# Patient Record
Sex: FEMALE | Race: OTHER | NOT HISPANIC OR LATINO | URBAN - METROPOLITAN AREA
[De-identification: names, ages, dates, MRNs, and addresses within clinical notes are randomized per-mention and may not be internally consistent; named-entity substitution may affect disease eponyms.]

---

## 2022-07-22 ENCOUNTER — OFFICE VISIT (OUTPATIENT)
Dept: URGENT CARE | Facility: CLINIC | Age: 32
End: 2022-07-22
Payer: COMMERCIAL

## 2022-07-22 VITALS
OXYGEN SATURATION: 98 % | RESPIRATION RATE: 16 BRPM | SYSTOLIC BLOOD PRESSURE: 122 MMHG | DIASTOLIC BLOOD PRESSURE: 87 MMHG | WEIGHT: 143.31 LBS | HEART RATE: 85 BPM | TEMPERATURE: 98 F | BODY MASS INDEX: 26.37 KG/M2 | HEIGHT: 62 IN

## 2022-07-22 DIAGNOSIS — R05.9 COUGH: ICD-10-CM

## 2022-07-22 DIAGNOSIS — U07.1 COVID-19 VIRUS INFECTION: Primary | ICD-10-CM

## 2022-07-22 LAB
CTP QC/QA: YES
SARS-COV-2 RDRP RESP QL NAA+PROBE: POSITIVE

## 2022-07-22 PROCEDURE — U0002 COVID-19 LAB TEST NON-CDC: HCPCS | Mod: QW,S$GLB,, | Performed by: NURSE PRACTITIONER

## 2022-07-22 PROCEDURE — 99203 PR OFFICE/OUTPT VISIT, NEW, LEVL III, 30-44 MIN: ICD-10-PCS | Mod: S$GLB,,, | Performed by: NURSE PRACTITIONER

## 2022-07-22 PROCEDURE — 99203 OFFICE O/P NEW LOW 30 MIN: CPT | Mod: S$GLB,,, | Performed by: NURSE PRACTITIONER

## 2022-07-22 PROCEDURE — U0002: ICD-10-PCS | Mod: QW,S$GLB,, | Performed by: NURSE PRACTITIONER

## 2022-07-22 NOTE — PROGRESS NOTES
"Subjective:       Patient ID: Denise Jenkins is a 32 y.o. female.    Vitals:  height is 5' 2" (1.575 m) and weight is 65 kg (143 lb 4.8 oz). Her temperature is 98.2 °F (36.8 °C). Her blood pressure is 122/87 and her pulse is 85. Her respiration is 16 and oxygen saturation is 98%.     Chief Complaint: Cough    33yo female pt reports body aches for past 3 days and cough that started yesterday.  Reports small amount of productive coughing with lt-yellow mucus.  Reports moderate relief with OTC cough syrup and Tylenol.  Reports that she tested positive for COVID-19 with at-home test yesterday.  Denies fever/chills, denies chest pain or SOB, denies n/v/d.  Denies known sick contacts, reports receiving vaccination for COVID.    Cough  This is a new problem. The current episode started in the past 7 days. The problem has been unchanged. The cough is productive of sputum. Associated symptoms include headaches, nasal congestion, postnasal drip and rhinorrhea. Pertinent negatives include no chills, fever, shortness of breath or wheezing. Associated symptoms comments: Muscle aches. Nothing aggravates the symptoms. She has tried OTC cough suppressant (tylenol) for the symptoms. The treatment provided mild relief.       Constitution: Negative for chills and fever.   HENT: Positive for postnasal drip. Negative for congestion, sinus pain and sinus pressure.    Respiratory: Positive for cough. Negative for shortness of breath and wheezing.    Gastrointestinal: Negative for nausea, vomiting and diarrhea.   Neurological: Positive for headaches.       Objective:      Physical Exam   Constitutional: She is oriented to person, place, and time. She appears well-developed. She is cooperative.  Non-toxic appearance. She does not appear ill. No distress.   HENT:   Head: Normocephalic and atraumatic.   Ears:   Right Ear: Hearing, tympanic membrane, external ear and ear canal normal.   Left Ear: Hearing, tympanic " membrane, external ear and ear canal normal.   Nose: Rhinorrhea (clear to BL nares) present. No mucosal edema, purulent discharge or nasal deformity. No epistaxis. Right sinus exhibits no maxillary sinus tenderness and no frontal sinus tenderness. Left sinus exhibits no maxillary sinus tenderness and no frontal sinus tenderness.   Mouth/Throat: Uvula is midline and mucous membranes are normal. No trismus in the jaw. Normal dentition. No uvula swelling. Oropharyngeal exudate (clear postnasal drip) present. No posterior oropharyngeal edema or posterior oropharyngeal erythema. Tonsils are 1+ on the right. Tonsils are 1+ on the left. No tonsillar exudate.   Eyes: Conjunctivae and lids are normal. No scleral icterus.   Neck: Trachea normal and phonation normal. Neck supple. No edema present. No erythema present. No neck rigidity present.   Cardiovascular: Normal rate, regular rhythm, normal heart sounds and normal pulses.   Pulmonary/Chest: Effort normal and breath sounds normal. No accessory muscle usage or stridor. No tachypnea. No respiratory distress. She has no decreased breath sounds. She has no wheezes. She has no rhonchi. She has no rales.   Abdominal: Normal appearance.   Musculoskeletal: Normal range of motion.         General: No deformity. Normal range of motion.   Lymphadenopathy:        Head (right side): No submandibular adenopathy present.        Head (left side): No submandibular adenopathy present.     She has no cervical adenopathy.   Neurological: She is alert and oriented to person, place, and time. She exhibits normal muscle tone. Coordination normal.   Skin: Skin is warm, dry, intact, not diaphoretic and not pale.   Psychiatric: Her speech is normal and behavior is normal. Judgment and thought content normal.   Nursing note and vitals reviewed.    Results for orders placed or performed in visit on 07/22/22   POCT COVID-19 Rapid Screening   Result Value Ref Range    POC Rapid COVID Positive (A)  Negative     Acceptable Yes            Assessment:       1. COVID-19 virus infection    2. Cough          Plan:       COVID-19 Risk Score:  0    Provided education on OTC symptomatic treatment (Claritin/Zyrtec with Flonase nasal spray for cough and Tylenol/ibuprofen for body aches and headache).  Provided education on CDC recommendations for isolation/masking and for return/ER precautions.  Pt verbalized understanding and agreed to treatment plan.      COVID-19 virus infection    Cough  -     POCT COVID-19 Rapid Screening      Patient Instructions   You have tested positive for COVID-19 today.      ISOLATION  If you tested positive and do not have symptoms, you must isolate for 5 days starting on the day of the positive test.    If you tested positive and have symptoms, you must isolate for 5 days starting on the day of the first symptoms, not the day of the positive test.     This is the most important part, both the CDC and the [facility] emphasize that you do not test out of isolation.     After 5 days, if your symptoms have improved and you have not had fever on day 5, you can return to the community on day 6- NO TESTING REQUIRED!      In fact, we do not re-test if you were positive in the last 90 days.    After your 5 days of isolation are completed, the CDC recommends strict mask use for the first 5 days that you come out of isolation.    -----    If your condition worsens or fails to improve, we recommend that you receive another evaluation at the ER immediately contact your PCP to discuss your concerns, or return here.  You must understand that you've received an urgent care treatment only, and that you may be released before all your medical problems are known or treated.  You, the patient, will arrange for follow-up care as instructed.     Flonase (fluticasone) is a nasal spray which is available over the counter and may help with your symptoms.  Zyrtec D, Claritin D, or Allegra D can also help  "with symptoms of congestion and drainage.  If you have hypertension, avoid using the "D" which is the decongestant formula.    If you just have drainage, you can take plain Zyrtec, Claritin, or Allegra.  If you just have a congested feeling, you can take pseudoephedrine (unless you have high blood pressure), which you have to sign for behind the counter.  Do not buy phenylephrine OTC, as it is not effective.    Rest and fluids are also important.  Tylenol or ibuprofen can also be used as directed for pain, unless you have an allergy to them or medical condition (such as stomach ulcers, kidney or liver disease, or use blood thinners, etc.) for which you should not be taking these type of medications.     If you are flying in the next few days, Afrin nose drops for the airplane flight upon take off and landing may help.  Other than at those times, refrain from using Afrin.     If you were prescribed a narcotic or sedating cough medicine, do not drive or operate heavy machinery while taking these medications.                        "

## 2022-07-22 NOTE — PATIENT INSTRUCTIONS
"You have tested positive for COVID-19 today.      ISOLATION  If you tested positive and do not have symptoms, you must isolate for 5 days starting on the day of the positive test.    If you tested positive and have symptoms, you must isolate for 5 days starting on the day of the first symptoms, not the day of the positive test.     This is the most important part, both the CDC and the [facility] emphasize that you do not test out of isolation.     After 5 days, if your symptoms have improved and you have not had fever on day 5, you can return to the community on day 6- NO TESTING REQUIRED!      In fact, we do not re-test if you were positive in the last 90 days.    After your 5 days of isolation are completed, the CDC recommends strict mask use for the first 5 days that you come out of isolation.    -----    If your condition worsens or fails to improve, we recommend that you receive another evaluation at the ER immediately contact your PCP to discuss your concerns, or return here.  You must understand that you've received an urgent care treatment only, and that you may be released before all your medical problems are known or treated.  You, the patient, will arrange for follow-up care as instructed.     Flonase (fluticasone) is a nasal spray which is available over the counter and may help with your symptoms.  Zyrtec D, Claritin D, or Allegra D can also help with symptoms of congestion and drainage.  If you have hypertension, avoid using the "D" which is the decongestant formula.    If you just have drainage, you can take plain Zyrtec, Claritin, or Allegra.  If you just have a congested feeling, you can take pseudoephedrine (unless you have high blood pressure), which you have to sign for behind the counter.  Do not buy phenylephrine OTC, as it is not effective.    Rest and fluids are also important.  Tylenol or ibuprofen can also be used as directed for pain, unless you have an allergy to them or medical condition (such " as stomach ulcers, kidney or liver disease, or use blood thinners, etc.) for which you should not be taking these type of medications.     If you are flying in the next few days, Afrin nose drops for the airplane flight upon take off and landing may help.  Other than at those times, refrain from using Afrin.     If you were prescribed a narcotic or sedating cough medicine, do not drive or operate heavy machinery while taking these medications.

## 2022-07-29 ENCOUNTER — OFFICE VISIT (OUTPATIENT)
Dept: URGENT CARE | Facility: CLINIC | Age: 32
End: 2022-07-29
Payer: COMMERCIAL

## 2022-07-29 VITALS
RESPIRATION RATE: 16 BRPM | TEMPERATURE: 97 F | SYSTOLIC BLOOD PRESSURE: 134 MMHG | HEIGHT: 62 IN | WEIGHT: 165 LBS | OXYGEN SATURATION: 96 % | BODY MASS INDEX: 30.36 KG/M2 | DIASTOLIC BLOOD PRESSURE: 83 MMHG | HEART RATE: 69 BPM

## 2022-07-29 DIAGNOSIS — M62.830 MUSCLE SPASM OF BACK: ICD-10-CM

## 2022-07-29 DIAGNOSIS — M54.9 BACK PAIN, UNSPECIFIED BACK LOCATION, UNSPECIFIED BACK PAIN LATERALITY, UNSPECIFIED CHRONICITY: ICD-10-CM

## 2022-07-29 DIAGNOSIS — M54.2 CERVICALGIA: Primary | ICD-10-CM

## 2022-07-29 PROCEDURE — 99213 PR OFFICE/OUTPT VISIT, EST, LEVL III, 20-29 MIN: ICD-10-PCS | Mod: 25,S$GLB,, | Performed by: NURSE PRACTITIONER

## 2022-07-29 PROCEDURE — 99213 OFFICE O/P EST LOW 20 MIN: CPT | Mod: 25,S$GLB,, | Performed by: NURSE PRACTITIONER

## 2022-07-29 PROCEDURE — 96372 THER/PROPH/DIAG INJ SC/IM: CPT | Mod: S$GLB,,, | Performed by: NURSE PRACTITIONER

## 2022-07-29 PROCEDURE — 96372 PR INJECTION,THERAP/PROPH/DIAG2ST, IM OR SUBCUT: ICD-10-PCS | Mod: S$GLB,,, | Performed by: NURSE PRACTITIONER

## 2022-07-29 RX ORDER — KETOROLAC TROMETHAMINE 30 MG/ML
30 INJECTION, SOLUTION INTRAMUSCULAR; INTRAVENOUS
Status: COMPLETED | OUTPATIENT
Start: 2022-07-29 | End: 2022-07-29

## 2022-07-29 RX ORDER — NAPROXEN 500 MG/1
500 TABLET ORAL 2 TIMES DAILY
Qty: 14 TABLET | Refills: 0 | Status: SHIPPED | OUTPATIENT
Start: 2022-07-29 | End: 2022-08-05

## 2022-07-29 RX ORDER — CYCLOBENZAPRINE HCL 10 MG
10 TABLET ORAL 3 TIMES DAILY PRN
Qty: 30 TABLET | Refills: 0 | Status: SHIPPED | OUTPATIENT
Start: 2022-07-29

## 2022-07-29 RX ADMIN — KETOROLAC TROMETHAMINE 30 MG: 30 INJECTION, SOLUTION INTRAMUSCULAR; INTRAVENOUS at 12:07

## 2022-07-29 NOTE — PROGRESS NOTES
"Subjective:       Patient ID: Denise Jenkins is a 32 y.o. female.    Vitals:  height is 5' 2" (1.575 m) and weight is 74.8 kg (165 lb). Her temperature is 96.6 °F (35.9 °C). Her blood pressure is 134/83 and her pulse is 69. Her respiration is 16 and oxygen saturation is 96%.     Chief Complaint: Back Pain    Pt is a 33 yo female w/ c/o upper back and neck pain that began 2 to 3 days ago. Pt states that is radiating to her right shoulder and sometimes that pain can also radiate down her right arm. Pt believes she have been watching tv and had her neck turned at an odd angle. She has tried ice and a topical cream for her symptoms with no relief. Pain is worse with palpation, not worse with movement.     Back Pain  This is a new problem. The current episode started in the past 7 days. The problem occurs constantly. The problem has been gradually worsening since onset. The quality of the pain is described as shooting. The pain is at a severity of 6/10. The pain is moderate. The symptoms are aggravated by position. Treatments tried: ointments. The treatment provided mild relief.       Neck: Positive for neck pain.   Musculoskeletal: Positive for back pain.       Objective:      Physical Exam   Constitutional: She is oriented to person, place, and time. She appears well-developed. She is cooperative. No distress.   HENT:   Head: Normocephalic and atraumatic.   Nose: Nose normal.   Mouth/Throat: Oropharynx is clear and moist and mucous membranes are normal.   Eyes: Conjunctivae and lids are normal.   Neck: Trachea normal and phonation normal. Neck supple. No erythema present. No decreased range of motion present. No pain with movement present.   Cardiovascular: Normal rate, regular rhythm, normal heart sounds and normal pulses.   Pulmonary/Chest: Effort normal and breath sounds normal.   Abdominal: Normal appearance and bowel sounds are normal. She exhibits no abdominal bruit, no pulsatile " midline mass and no mass. Soft.   Musculoskeletal:         General: No deformity.      Cervical back: She exhibits tenderness and spasm. She exhibits no swelling.   Neurological: She is alert and oriented to person, place, and time. She has normal strength and normal reflexes. No sensory deficit.   Skin: Skin is warm, dry, intact and not diaphoretic.   Psychiatric: Her speech is normal and behavior is normal. Judgment and thought content normal.   Nursing note and vitals reviewed.           Assessment:       1. Cervicalgia    2. Back pain, unspecified back location, unspecified back pain laterality, unspecified chronicity    3. Muscle spasm of back          Plan:         Cervicalgia  -     ketorolac injection 30 mg  -     naproxen (NAPROSYN) 500 MG tablet; Take 1 tablet (500 mg total) by mouth 2 (two) times daily. for 7 days  Dispense: 14 tablet; Refill: 0    Back pain, unspecified back location, unspecified back pain laterality, unspecified chronicity  -     ketorolac injection 30 mg  -     naproxen (NAPROSYN) 500 MG tablet; Take 1 tablet (500 mg total) by mouth 2 (two) times daily. for 7 days  Dispense: 14 tablet; Refill: 0    Muscle spasm of back  -     cyclobenzaprine (FLEXERIL) 10 MG tablet; Take 1 tablet (10 mg total) by mouth 3 (three) times daily as needed for Muscle spasms.  Dispense: 30 tablet; Refill: 0         Patient Instructions   - You must understand that you have received an Urgent Care treatment only and that you may be released before all of your medical problems are known or treated.   - You, the patient, will arrange for follow up care as instructed.   - If your condition worsens or fails to improve we recommend that you receive another evaluation at the ER immediately or contact your PCP to discuss your concerns.   - You can call [phone] or [phone] to help schedule an appointment with the appropriate provider.    Do not start naproxen until after 8 pm today.    Rest as much as  possible  Alternate heat and ice. Apply heat for 20-30 minutes, perform gentle range of motion exercises, and then apply ice for 15 minutes. Do this 3-4 times per day.  The medication you have been prescribed (cyclobenzaprine) may cause drowsiness. Do not drive or drink alcohol while taking this medication.

## 2022-07-29 NOTE — PATIENT INSTRUCTIONS
- You must understand that you have received an Urgent Care treatment only and that you may be released before all of your medical problems are known or treated.   - You, the patient, will arrange for follow up care as instructed.   - If your condition worsens or fails to improve we recommend that you receive another evaluation at the ER immediately or contact your PCP to discuss your concerns.   - You can call [phone] or [phone] to help schedule an appointment with the appropriate provider.    Do not start naproxen until after 8 pm today.    Rest as much as possible  Alternate heat and ice. Apply heat for 20-30 minutes, perform gentle range of motion exercises, and then apply ice for 15 minutes. Do this 3-4 times per day.  The medication you have been prescribed (cyclobenzaprine) may cause drowsiness. Do not drive or drink alcohol while taking this medication.

## 2024-06-27 ENCOUNTER — TELEPHONE (OUTPATIENT)
Dept: ENDOCRINOLOGY | Facility: CLINIC | Age: 34
End: 2024-06-27
Payer: COMMERCIAL

## 2024-06-27 ENCOUNTER — OFFICE VISIT (OUTPATIENT)
Dept: ENDOCRINOLOGY | Facility: CLINIC | Age: 34
End: 2024-06-27
Payer: COMMERCIAL

## 2024-06-27 VITALS
HEART RATE: 92 BPM | WEIGHT: 159.38 LBS | SYSTOLIC BLOOD PRESSURE: 123 MMHG | BODY MASS INDEX: 29.15 KG/M2 | DIASTOLIC BLOOD PRESSURE: 86 MMHG | OXYGEN SATURATION: 99 %

## 2024-06-27 DIAGNOSIS — E11.65 TYPE 2 DIABETES MELLITUS WITH HYPERGLYCEMIA, WITHOUT LONG-TERM CURRENT USE OF INSULIN: ICD-10-CM

## 2024-06-27 DIAGNOSIS — N92.6 IRREGULAR MENSTRUAL CYCLE: ICD-10-CM

## 2024-06-27 DIAGNOSIS — E03.9 HYPOTHYROIDISM (ACQUIRED): Primary | ICD-10-CM

## 2024-06-27 PROCEDURE — 3079F DIAST BP 80-89 MM HG: CPT | Mod: CPTII,S$GLB,, | Performed by: INTERNAL MEDICINE

## 2024-06-27 PROCEDURE — 3008F BODY MASS INDEX DOCD: CPT | Mod: CPTII,S$GLB,, | Performed by: INTERNAL MEDICINE

## 2024-06-27 PROCEDURE — 1159F MED LIST DOCD IN RCRD: CPT | Mod: CPTII,S$GLB,, | Performed by: INTERNAL MEDICINE

## 2024-06-27 PROCEDURE — 99204 OFFICE O/P NEW MOD 45 MIN: CPT | Mod: S$GLB,,, | Performed by: INTERNAL MEDICINE

## 2024-06-27 PROCEDURE — 3074F SYST BP LT 130 MM HG: CPT | Mod: CPTII,S$GLB,, | Performed by: INTERNAL MEDICINE

## 2024-06-27 PROCEDURE — 99999 PR PBB SHADOW E&M-EST. PATIENT-LVL III: CPT | Mod: PBBFAC,,, | Performed by: INTERNAL MEDICINE

## 2024-06-27 PROCEDURE — 1160F RVW MEDS BY RX/DR IN RCRD: CPT | Mod: CPTII,S$GLB,, | Performed by: INTERNAL MEDICINE

## 2024-06-27 RX ORDER — LEVOTHYROXINE SODIUM 75 UG/1
TABLET ORAL
COMMUNITY
Start: 2024-06-21

## 2024-06-27 RX ORDER — PIOGLITAZONE HCL AND METFORMIN HCL 850; 15 MG/1; MG/1
2 TABLET ORAL EVERY MORNING
COMMUNITY
Start: 2024-06-03

## 2024-06-27 NOTE — TELEPHONE ENCOUNTER
"Returned pts call in regards to questions below. Pt inquired if she can bring copy of passport as ID source. Informed pt that we would have to check with registration/. Pt was on hold for 5 minutes. Once call was resumed, pt had an attitude and stated "never mind since you took 5 minutes to find out."   Also informed pt that as long as she arrives and is checked in within 15 minutes she can be seen for her appointment today. Pt stated GPS says she will arrive at 2:14 PM. Pt verbally acknowledged understanding of cal period and late policy.     ----- Message from Paige Ramirez sent at 6/27/2024  1:49 PM CDT -----  Type:  Appointment Request     Name of Caller:EUGENE ZAIDI [[id number]]  When is the first available appointment?No access  Symptoms:  Would the patient rather a call back or a response via [facility]? call  Best Call Back Number:[phone]   Additional Information: Patient has a 2pm appt with the provider today 6/27. They would like to know if they can bring a copy of their passport as their id. As well the patient states they will be at the appt at 2:30pm and would like to know if that is okay. Please give the patient a call.  "

## 2024-06-27 NOTE — PROGRESS NOTES
Subjective:      Patient ID: Denise Jenkins is referred by Rhona Rios, *     Chief Complaint:  Hypothyroidism and Type 2 diabetes    History of Present Illness    Denise Jenkins is a 34 y.o. female who presents for evaluation of hypothyroidism and type 2 diabetes    Hypothyroidism    Dx in 2018.   Patient says she presented with irregular cycles.    Current medication:  Levothyroxine 75 mcg daily  Takes as instructed    Cardiovascular disorder: Denies    Family history of thyroid disease: Denies      Patient reports irregular cycles since her teens.   She gets menstrual cycles 2 to 3 times a year  Menarche at 14 years.   No prior pregnancy, no plans for pregnancy at this time.    Reports random coarse hair on the chin.   Acne - Denies    Reports doing low-carbohydrate diet and has had slow weight loss.  Weight today was 159 lb, BMI of 29.      TSH in 03/2024 was normal at 3.8 and free T4 was 1.54.    Labs in 03/2024 showed LH of 11.5, FSH 6.2 (within normal range), normal total testosterone of 40 and free testosterone of 6.5 (0.0-4.2 pg/mL)    Labs:        US pelvis - reportedly had small cysts.         Type 2 diabetes    Diagnosed w/ DM: 5/2023, on blood test.   Complications:   Retinopathy:  Has not had a diabetic eye exam yet.  Neuropathy: Denies  Nephropathy: Denies  Cardiovascular: Denies  Gastroparesis:  Denies  DKA/HHS: Denies    Severe Hypoglycemia: Denies  Hypoglycemia unawareness: Denies  Hypoglycemic episodes:  None recently.      Current meds:   Actos-metformin  mg, 2 tablets daily.     Compliance with meds: Yes     Previous meds:  None     Home glucose checks: Once a week, on recall mostly around 120.     Diet/Exercise:   3 meals a day, occasional snacks.   Takes adequate water.   Exercise  - No exercise    Diabetes education:  6/2023 - [facility]    Last A1c:   3/2024 - 7.1%.  Yes    Microalbumin:   Recently normal    Lipids:   See  above      Aspirin: No  Statins: No  ACEI/ARB: No    HTN:  Denies    Denies history of pancreatitis or personal/FH of medullary thyroid cancer.  History recurrent UTI/yeast infections: Denies    Polyuria/Polydipsia: Denies      FHx of DM: Mother with type 2 diabetes  Heart disease: Aunt had stroke.      ROS: see HPI     Objective:     Physical Exam     /86 (BP Location: Right arm, Patient Position: Sitting, BP Method: Large (Automatic))   Pulse 92   Wt 72.3 kg (159 lb 6.3 oz)   SpO2 99%   BMI 29.15 kg/m²     Wt Readings from Last 3 Encounters:   06/27/24 72.3 kg (159 lb 6.3 oz)   07/29/22 74.8 kg (165 lb)   07/22/22 65 kg (143 lb 4.8 oz)       Constitutional:  Pleasant,  in no acute distress.   HENT:   Head:    Normocephalic and atraumatic.   Eyes:    EOMI. No scleral icterus.   Cardiovascular:  Normal rate  Respiratory:   Effort normal   Neurological:  No tremor  Skin:    Skin is warm, dry  Extremity:  No edema    DIABETIC FOOT EXAM: 7/9/2024 - normal sensation to monofilament testing bilaterally.  No fissures, wounds, or ulcers.    LABORATORY REVIEW:  See HPI for other labs reviewed today      Other labs reviewed today in HPI    Assessment/Plan:     Problem List Items Addressed This Visit          Renal/    Irregular menstrual cycle       Possible diagnosis of PCOS.  Irregular cycles since her teens.  Has random coarse hair on her chin.  Reportedly had small cysts in ovaries, ultrasound not available for review.  Recent labs showed mildly elevated free testosterone.    Currently on metformin.  Had gradual weight loss in the past few months.  Encouraged to continue good diet and lifestyle modification.              Endocrine    Hypothyroidism (acquired) - Primary       Currently on levothyroxine 75 mcg daily.  Reports compliance and takes it as instructed.  Recent thyroid function test was normal, monitor periodically.  Clinically euthyroid.           Type 2 diabetes mellitus with hyperglycemia, without  long-term current use of insulin       Last A1c was 7.1, improved.   Continue current diabetes regimen.  Call if any side effects from the medications.    Discussed goal A1c of 6.5-7%  Check blood sugars before meals and bedtime.  Call if blood sugars are persistently less than 70 or greater than 200.     Discussed importance of diet and lifestyle modifications for diabetes management  Hypoglycemia management discussed. Carry glucose tablets or snacks at all times.   Discussed risk of complications with uncontrolled diabetes.    Complications:  Follow up for regular diabetes eye exam  Daily self examination of feet.  Microalbumin: Monitor.     Patient says she will have labs done at her PCP's office.           Relevant Medications    pioglitazone-metformin (ACTOPLUS MET)  mg per tablet        Marlene Morales MD

## 2024-07-09 PROBLEM — N92.6 IRREGULAR MENSTRUAL CYCLE: Status: ACTIVE | Noted: 2024-07-09

## 2024-07-09 PROBLEM — E03.9 HYPOTHYROIDISM (ACQUIRED): Status: ACTIVE | Noted: 2024-07-09

## 2024-07-09 PROBLEM — E11.65 TYPE 2 DIABETES MELLITUS WITH HYPERGLYCEMIA, WITHOUT LONG-TERM CURRENT USE OF INSULIN: Status: ACTIVE | Noted: 2024-07-09

## 2024-07-09 NOTE — ASSESSMENT & PLAN NOTE
Currently on levothyroxine 75 mcg daily.  Reports compliance and takes it as instructed.  Recent thyroid function test was normal, monitor periodically.  Clinically euthyroid.

## 2024-07-09 NOTE — ASSESSMENT & PLAN NOTE
Possible diagnosis of PCOS.  Irregular cycles since her teens.  Has random coarse hair on her chin.  Reportedly had small cysts in ovaries, ultrasound not available for review.  Recent labs showed mildly elevated free testosterone.    Currently on metformin.  Had gradual weight loss in the past few months.  Encouraged to continue good diet and lifestyle modification.

## 2024-07-09 NOTE — ASSESSMENT & PLAN NOTE
Last A1c was 7.1, improved.   Continue current diabetes regimen.  Call if any side effects from the medications.    Discussed goal A1c of 6.5-7%  Check blood sugars before meals and bedtime.  Call if blood sugars are persistently less than 70 or greater than 200.     Discussed importance of diet and lifestyle modifications for diabetes management  Hypoglycemia management discussed. Carry glucose tablets or snacks at all times.   Discussed risk of complications with uncontrolled diabetes.    Complications:  Follow up for regular diabetes eye exam  Daily self examination of feet.  Microalbumin: Monitor.     Patient says she will have labs done at her PCP's office.

## 2025-04-11 DIAGNOSIS — E11.9 TYPE 2 DIABETES MELLITUS: Primary | ICD-10-CM

## 2025-04-24 ENCOUNTER — OFFICE VISIT (OUTPATIENT)
Dept: ENDOCRINOLOGY | Facility: CLINIC | Age: 35
End: 2025-04-24
Payer: COMMERCIAL

## 2025-04-24 VITALS
HEIGHT: 62 IN | RESPIRATION RATE: 18 BRPM | BODY MASS INDEX: 30.44 KG/M2 | HEART RATE: 76 BPM | WEIGHT: 165.38 LBS | DIASTOLIC BLOOD PRESSURE: 84 MMHG | SYSTOLIC BLOOD PRESSURE: 130 MMHG | OXYGEN SATURATION: 99 %

## 2025-04-24 DIAGNOSIS — E03.9 HYPOTHYROIDISM (ACQUIRED): ICD-10-CM

## 2025-04-24 DIAGNOSIS — E11.65 TYPE 2 DIABETES MELLITUS WITH HYPERGLYCEMIA, WITHOUT LONG-TERM CURRENT USE OF INSULIN: Primary | ICD-10-CM

## 2025-04-24 DIAGNOSIS — E11.9 TYPE 2 DIABETES MELLITUS: ICD-10-CM

## 2025-04-24 PROCEDURE — 3008F BODY MASS INDEX DOCD: CPT | Mod: CPTII,S$GLB,,

## 2025-04-24 PROCEDURE — 1159F MED LIST DOCD IN RCRD: CPT | Mod: CPTII,S$GLB,,

## 2025-04-24 PROCEDURE — 3075F SYST BP GE 130 - 139MM HG: CPT | Mod: CPTII,S$GLB,,

## 2025-04-24 PROCEDURE — 3079F DIAST BP 80-89 MM HG: CPT | Mod: CPTII,S$GLB,,

## 2025-04-24 PROCEDURE — 99214 OFFICE O/P EST MOD 30 MIN: CPT | Mod: S$GLB,,,

## 2025-04-24 PROCEDURE — G2211 COMPLEX E/M VISIT ADD ON: HCPCS | Mod: S$GLB,,,

## 2025-04-24 PROCEDURE — 99999 PR PBB SHADOW E&M-EST. PATIENT-LVL IV: CPT | Mod: PBBFAC,,,

## 2025-04-24 NOTE — ASSESSMENT & PLAN NOTE
Currently on levothyroxine 75 mcg daily being managed by Dr. Rios  Clinically and biochemically euthroid.

## 2025-04-24 NOTE — PROGRESS NOTES
"Subjective:      Patient ID: Denise Jenkins is a 34 y.o. female.    Chief Complaint:  Diabetes    History of Present Illness  Denise Jenkins is here for initial evaluation of T2DM.  Previously seen by Dr Morales.  Last seen 6/27/2024.  This is their first visit with me.      With regards to diabetes:    Diagnosed: 5/2023  FH: Mother  Known complications:  DKA Denies  RN Denies  Eye Exam:  ~2 years ago;  no laser surgery or DR  PN:  Denies  Foot exam:  6/2024  Nephropathy Denies  CAD Denies  Denies history of pancreatitis & personal/family history of medullary thyroid cancer.     Current regimen:  Actos-metformin  mg, 2 tablets daily     Compliant Yes  Missed doses Denies    Other medications tried:  None    Glucose Monitor: SMBG  2-3 times weekly  Log reviewed:    Fasting 120s - 130s   Prandial 100s - 115s     Hypoglycemia:  Denies  Knows how to correct with 15 grams of carbs- juice, coke, or a peppermint.     Symptoms:  Polyuria Denies  Polydipsia Denies  Unintentional/unexplained weight changes Gained weight after diagnosis  Vision changes Denies    Diet/Exercise:  Eats 3 meals a day.   Snacks Occasional before dinner: Nuts, sweets  Drinks Water  Exercise : Recently started jogging 3x weekly  Recent illness, injury, steroids: Denies    Diabetes education: Denies    Diabetes Management Status    Labs done at outside facility per Dr. Rios  A1c via [facility] patient portal: 7.3    Statin: Not taking  ACE/ARB: Not taking  Screening or Prevention Patient's value Goal Complete/Controlled?   HgA1C Testing and Control   No results found for: "HGBA1C"   Annually/Less than 8% No   Lipid profile Most Recent Lipid Panel Health Maintenance Topic Completion: Not Found Annually No   LDL control No results found for: "LDLCALC" Annually/Less than 100 mg/dl  No   Nephropathy screening No results found for: "LABMICR"  No results found for: "PROTEINUA" Annually No   Blood " "pressure BP Readings from Last 1 Encounters:   04/24/25 130/84    Less than 140/90 Yes   Dilated retinal exam Most Recent Eye Exam Date: Not Found Annually Yes   Foot exam   Most Recent Foot Exam Date: Not Found Annually Yes     With regards to hypothyroidism:    Managed per Dr. Rios.    Last TSH at goal:  2.940  Free T4 1.49    Diagnosed 2018.      Current Medication:  Levothyroxine 75 mcg daily    Computed FIB-4 Calculation unavailable. One or more values for this score either were not found within the given timeframe or did not fit some other criterion.     FIB-4 below 1.30 is considered as low-risk for advanced fibrosis  FIB-4 over 2.67 is considered as high-risk for advanced fibrosis  FIB-4 values between 1.30 and 2.67 are considered as intermediate-risk of advanced fibrosis for ages 36-64.     For ages > 64 the cut-off for low-risk goes to < 2.  This is a screening tool and clinical judgement should be used in the interpretation of these results.    Kidney Failure Risk Equation (Tangri)    Kidney Failure Risk at 2 years: Unavailable    Kidney Failure Risk at 5 years: Unavailable    No results found for: "MICALBCREAT", "CREATININE"      Review of Systems - As above    Objective:   Physical Exam  Constitutional:       Appearance: She is well-developed. She is obese.   HENT:      Head: Normocephalic.   Eyes:      Conjunctiva/sclera: Conjunctivae normal.   Pulmonary:      Effort: Pulmonary effort is normal.   Musculoskeletal:         General: Normal range of motion.   Skin:     General: Skin is warm.   Neurological:      Mental Status: She is alert and oriented to person, place, and time.       Visit Vitals  /84 (BP Location: Right arm, Patient Position: Sitting)   Pulse 76   Resp 18   Ht 5' 2" (1.575 m)   Wt 75 kg (165 lb 5.5 oz)   LMP 04/09/2025 (Approximate)   SpO2 99%   BMI 30.24 kg/m²     Body mass index is 30.24 kg/m².    Lab Review:   No results found for: "HGBA1C"    No results found for: "CHOL", " ""HDL", "LDLCALC", "TRIG", "CHOLHDL"  No results found for: "NA", "K", "CL", "CO2", "GLU", "BUN", "CREATININE", "CALCIUM", "PROT", "ALBUMIN", "BILITOT", "ALKPHOS", "AST", "ALT", "ANIONGAP", "ESTGFRAFRICA", "EGFRNONAA", "TSH"  No results found for: "[id number]"  Assessment and Plan     1. Type 2 diabetes mellitus with hyperglycemia, without long-term current use of insulin        2. Type 2 diabetes mellitus  Ambulatory referral/consult to Endocrinology      3. Hypothyroidism (acquired)            Type 2 diabetes mellitus with hyperglycemia, without long-term current use of insulin  -- Reviewed goals of therapy are to get the best control we can without hypoglycemia  At this time patient does not wish to make any medication changes to her current regimen.  At this visit we discussed recommendations.  Patient is to work on diet and exercise and if A1c does not show improvement at next visit will strongly consider changes to mediation management and adding GLP1 for positive benefit profile including but not limited to BG management and weight loss.  -- Labs prior to follow up.  -- A1c above goal <7%.  -- Reviewed logs/CGM:  Per oral recall  Fasting 120s - 130s   Prandial 100s - 115s   -- Advised frequent self blood glucose monitoring.  Patient encouraged to document glucose results and bring them to every clinic visit to better evaluate BG trends  -- Call for Bg repeatedly < 70 or > 180.   -- Close adherence to lifestyle changes recommended.   -- Periodic follow ups for eye evaluations, foot care and dental care suggested.  -- Encouraged exercise per ADA guidelines.    -- Encouraged dietary modifications including but not limited to DM diet, low carb snacks, marrying carbohydrates with proteins.  Medication Regimen:   Continue Actos-metformin  mg, 2 tablets daily     Hypothyroidism (acquired)  Currently on levothyroxine 75 mcg daily being managed by Dr. Rios  Clinically and biochemically euthroid.      Follow " up in about 3 months (around 7/24/2025).    JUANPABLO Person  [facility]     Case discussed with Dr. Darby.  Recommendations were discussed with the patient in detail.  The patient verbalized understanding and agrees with the plan outlined as above.     Visit today included increased complexity associated with the care of the problems addressed and managing the longitudinal care of the patient due to the serious and/or complex managed problems.

## 2025-04-24 NOTE — ASSESSMENT & PLAN NOTE
-- Reviewed goals of therapy are to get the best control we can without hypoglycemia  At this time patient does not wish to make any medication changes to her current regimen.  At this visit we discussed recommendations.  Patient is to work on diet and exercise and if A1c does not show improvement at next visit will strongly consider changes to mediation management and adding GLP1 for positive benefit profile including but not limited to BG management and weight loss.  -- Labs prior to follow up.  -- A1c above goal <7%.  -- Reviewed logs/CGM:  Per oral recall  Fasting 120s - 130s   Prandial 100s - 115s   -- Advised frequent self blood glucose monitoring.  Patient encouraged to document glucose results and bring them to every clinic visit to better evaluate BG trends  -- Call for Bg repeatedly < 70 or > 180.   -- Close adherence to lifestyle changes recommended.   -- Periodic follow ups for eye evaluations, foot care and dental care suggested.  -- Encouraged exercise per ADA guidelines.    -- Encouraged dietary modifications including but not limited to DM diet, low carb snacks, marrying carbohydrates with proteins.  Medication Regimen:   Continue Actos-metformin  mg, 2 tablets daily